# Patient Record
Sex: MALE | Race: BLACK OR AFRICAN AMERICAN | Employment: FULL TIME | URBAN - METROPOLITAN AREA
[De-identification: names, ages, dates, MRNs, and addresses within clinical notes are randomized per-mention and may not be internally consistent; named-entity substitution may affect disease eponyms.]

---

## 2017-01-24 ENCOUNTER — OFFICE VISIT (OUTPATIENT)
Dept: NEUROLOGY | Facility: CLINIC | Age: 32
End: 2017-01-24

## 2017-01-24 ENCOUNTER — TELEPHONE (OUTPATIENT)
Dept: NEUROLOGY | Facility: CLINIC | Age: 32
End: 2017-01-24

## 2017-01-24 VITALS
HEART RATE: 91 BPM | DIASTOLIC BLOOD PRESSURE: 87 MMHG | HEIGHT: 73 IN | BODY MASS INDEX: 37.14 KG/M2 | SYSTOLIC BLOOD PRESSURE: 136 MMHG | WEIGHT: 280.2 LBS

## 2017-01-24 DIAGNOSIS — G40.802 OTHER EPILEPSY WITHOUT STATUS EPILEPTICUS, NOT INTRACTABLE (H): Primary | ICD-10-CM

## 2017-01-24 LAB
BUN SERPL-MCNC: 11 MG/DL (ref 7–30)
CREAT SERPL-MCNC: 0.7 MG/DL (ref 0.66–1.25)
GFR SERPL CREATININE-BSD FRML MDRD: NORMAL ML/MIN/1.7M2

## 2017-01-24 RX ORDER — LEVETIRACETAM 500 MG/1
TABLET ORAL
Qty: 180 TABLET | Refills: 11 | Status: SHIPPED | OUTPATIENT
Start: 2017-01-24 | End: 2018-01-08

## 2017-01-24 NOTE — MR AVS SNAPSHOT
After Visit Summary   2017    Shabnam Maddox    MRN: [id number]           Patient Information     Date Of Birth          1985        Visit Information        Provider Department      2017 10:00 AM Sayra Hendricks PA [facility]        Today's Diagnoses     Other epilepsy without status epilepticus, not intractable (H)    -  1       Care Instructions    Continue levetiracetam as before  Call if seizures or concerns  Follow-up in 1 year         Follow-ups after your visit        Follow-up notes from your care team     Return in about 1 year (around 2018).      Who to contact     Please call your clinic at [phone] to:    Ask questions about your health    Make or cancel appointments    Discuss your medicines    Learn about your test results    Speak to your doctor   If you have compliments or concerns about an experience at your clinic, or if you wish to file a complaint, please contact [facility] Patient Relations at [phone] or email us at [email or web address]         Additional Information About Your Visit        MyChart Information     [employer] is an electronic gateway that provides easy, online access to your medical records. With [employer]mos, you can request a clinic appointment, read your test results, renew a prescription or communicate with your care team.     To sign up for [employer] visit the website at [email or web address]   You will be asked to enter the access code listed below, as well as some personal information. Please follow the directions to create your username and password.     Your access code is: [email or web address]  Expires: 2017 10:32 AM     Your access code will  in 90 days. If you need help or a new code, please contact your [facility] or call [phone] for assistance.        Care EveryWhere ID     This is your Care EveryWhere ID. This could be used by  "other organizations to access your [place] medical records  [id number]        Your Vitals Were     Pulse Height BMI (Body Mass Index)             91 6' 0.52\" (184.2 cm) 37.46 kg/m2          Blood Pressure from Last 3 Encounters:   01/24/17 136/87   01/19/16 133/84   01/19/15 129/79    Weight from Last 3 Encounters:   01/24/17 280 lb 3.2 oz (127.098 kg)   01/19/16 277 lb 9.6 oz (125.919 kg)   01/19/15 246 lb 12.8 oz (111.948 kg)              We Performed the Following     Creatinine     Levetiracetam level     Urea nitrogen (BUN)          Where to get your medicines      These medications were sent to [facility] [address]  [address] [place] [address] [address], [facility] [address]     Phone:  [phone]    - levETIRAcetam 500 MG tablet       Primary Care Provider Office Phone # Fax #    Steven Sousa [phone]       [facility] [id number] [facility] [address]        Thank you!     Thank you for choosing [facility]  for your care. Our goal is always to provide you with excellent care. Hearing back from our patients is one way we can continue to improve our services. Please take a few minutes to complete the written survey that you may receive in the mail after your visit with us. Thank you!             Your Updated Medication List - Protect others around you: Learn how to safely use, store and throw away your medicines at www.disposemymeds.org.          This list is accurate as of: 1/24/17 10:32 AM.  Always use your most recent med list.                   Brand Name Dispense Instructions for use    DAILY MULTIVITAMIN PO      Take by mouth daily       diazepam 5 MG/ML (HIGH CONC) solution    DIAZEPAM INTENSOL    30 mL    Sublingual prn for any seizure lasting 3 minutes or longer. May repeat x1 in 24 hours.       levETIRAcetam 500 MG tablet    KEPPRA    180 tablet    Take 3 tabs am and 3 tabs hs         "

## 2017-01-24 NOTE — TELEPHONE ENCOUNTER
Patient was seen today in clinic by Sayra Hendricks PA-C. Staff requested a new seizure protocol with todays date. Seizure protocol done and signed by Sayra and mailed to address listed in patient chart.

## 2017-01-24 NOTE — Clinical Note
Patient:  Shabnam Maddox  :   1985  MRN:     [id number]        Mr.Danieal SARAY Maddox  [address] [facility] [id number]        2017    Dear ,    We are writing to inform you of your test results.    Your test results fall within the expected range(s) or remain unchanged from previous results.  Please continue with current treatment plan.    Resulted Orders   Levetiracetam level   Result Value Ref Range    Levetiracetam Level 34.4       Comment:      Analysis performed by [employer], [employer] [place] [address]  Reference range: 10.0 to 40.0  Unit: ug/mL     Urea nitrogen (BUN)   Result Value Ref Range    Urea Nitrogen 11 7 - 30 mg/dL   Creatinine   Result Value Ref Range    Creatinine 0.70 0.66 - 1.25 mg/dL    GFR Estimate >90  Non  GFR Calc   >60 mL/min/1.7m2    GFR Estimate If Black >90   GFR Calc   >60 mL/min/1.7m2       Sayra Hendricks PA-C              [phone]  1985

## 2017-01-24 NOTE — Clinical Note
2017       RE: Shabnam Maddox  : 1985   MRN: [id number]      Dear Colleague,    Thank you for referring your patient, Shabnam Maddox, to the [facility] EPILEPSY [facility] at [facility]. Please see a copy of my visit note below.    [facility] Progress Note      Patient:  Shabnam Maddox  :  1985   Age:  31 year old   Today's Office Visit:  2017    Epilepsy Data:    Patient History: Gentleman with a history of developmental delay with static encephalopathy and autism who began having seizures at age 15. Initially seizures were infrequent. He went 7 years without a seizure on a very low dose of Tegretol. He had worsening of seizures , requiring escalating doses of Tegretol XR. Based on history available and the seizure semiology described by mother and group home staff, it is difficult to determine if he has a generalized seizure disorder or a partial onset seizure disorder. EEG at [facility] was fairly unremarkable, with no interictal discharges. We transitioned the Tegretol to levetiracetam and titrated up to 1500 mg BID and he has done well and has been seizure free. He takes generic levetiracetam.    Primary Epileptologist/Provider: Sayra MORIN  Epilepsy Syndrome:  (Undetermined)  Epilepsy Syndrome Status:  (undetermined)  Age of Onset: 15  Other Relevant Dx/ Issues: Mother is legal guardian     Tests/Surgery History  Last EE09  Last MRI: 09    Seizure Record  Current Visit Date: 17  Previous Visit Date: 16  Months since last visit: 12.19  Seizure Type 1: Unclassified Seizure  Description of Sz Type 1: whole jackie shaking and jerking, guttural sounds and deep breathing  # of Type 1 Seizure since last visit: 0  Freq. Type 1 / Month: 0    History of Present Illness:   No seizures this interval. Last seizure around 4485-9020. No problems tolerating levetiracetam. No concerns today.       Current Outpatient  "Prescriptions   Medication Sig Dispense Refill     diazepam (DIAZEPAM INTENSOL) 5 MG/ML solution Sublingual prn for any seizure lasting 3 minutes or longer. May repeat x1 in 24 hours. 30 mL 0     levETIRAcetam (KEPPRA) 500 MG tablet Take 3 tabs am and 3 tabs hs 180 tablet 11     Multiple Vitamin (DAILY MULTIVITAMIN PO) Take by mouth daily          Medication Notes:     Ref. Range 1/19/2015 12:04 1/19/2016 09:02   Levetiracetam Level Unknown 39.4 29.3     AED Side Effects Discussed: Yes   AED Medication Compliance:  compliant all of the time  Using a pill box:  Staff administered    Review of Systems:  Lethargy / Tiredness:  No  Nausea / Vomiting:  No  Double Vision:  No  Sleepiness:  No  Poor Balance:  No  Dizziness:  No  Appetite Changes:  No  Blurred Vision:  No  Sleep Changes:  No  Behavioral Changes:  No    Have you experienced a traumatic fall since your last visit: NO  Are these falls related to your seizures: Not Applicable    Other Issues:    Per staff exercising daily walking on treadmill and diet monitored to best of their ability  No changes in overall health  Mood and behavior seem stable.     Is patient safe to drive:  N/A    Exam:    /87 mmHg  Pulse 91  Ht 6' 0.52\" (184.2 cm)  Wt 280 lb 3.2 oz (127.098 kg)  BMI 37.46 kg/m2     Wt Readings from Last 5 Encounters:   01/24/17 280 lb 3.2 oz (127.098 kg)   01/19/16 277 lb 9.6 oz (125.919 kg)   01/19/15 246 lb 12.8 oz (111.948 kg)   12/31/13 261 lb 9.6 oz (118.661 kg)     General Appearance: Abnormal - alert, cognitive impairment  Gait: Normal, mildly wide-based  Attention Span: Abnormal - consistent with moderate static encephalopathy  Language: Abnormal - Follows most simple commands but not all, echolalia not noted today as in past  Extraocular Movements: Normal  Coordination: No tremor or dysmetria  Visual Fields: Not Examined  Facial Sensation: Not Examined  Facial Strength: Normal  Tongue Strength: Normal  Limb Strength: Able to stand from a " chair on his own, uncooperative with formal strength testing   Limb Tone: Not Examined  Limb Sensation: Not Examined    Assessment and Plan:   1. He is a 31 year old male with developmental delay, moderate static encephalopathy, autism and seizures. Since being transitioned to levetiracetam in 2009 he has remained seizure free.    Patient Instructions   Continue levetiracetam as before  Call if seizures or concerns  Follow-up in 1 year     As described above, I met with the patient for 15 minutes and during this time counseling was less than 50% of the visit time.    Again, thank you for allowing me to participate in the care of your patient.      Sincerely,    JEAN CARLOS Ovalles

## 2017-01-24 NOTE — Clinical Note
2017       RE: Shabnam Maddox  : 1985   MRN: [id number]      Name:  Shabnam Maddox   :  1985     Seizure Type Length Frequency Description    unclassified type  unknown Seizure  free x < 4 yrs whole body shaking,          guttural sound         deep breathing                  Treatment Protocol:  (including daily and emergency medications)    Emergency Med? Medication Dosage and Time Given Route of Admin Common Side Effects and Special Instructions    no  Levetiracetm  1500 mg Twice daily By Mouth  Irratability           Call if symptomatic                YES diazepam Intensol 5mg / ml  Give 1 ml ( 5mg ) for  Sublingual  lethargy       Seizure lasting  More than 3 minutes. May repeat once in 24 hours.     poor balance           Basic First Aid - Care and Comfort:   Basic Seizure First Aid:  - Stay calm and track time  - Keep person safe  - Do not restrain  - Do not put anything in mouth  - Stay with person until fully conscious  - Record seizure in log    For tonic-clonic (grand mal) seizure:  - Protect head  - Keep airway open and watch breathing and color  - After the seizure, turn person on side        Does person need to leave the room/area after a seizure:  NO    Emergency Response:  A SEIZURE IS CONSIDERED AN EMERGENY WHEN:   - A convulsion (tonic-clonic) seizure lasts longer than 5 minutes  - There are repeated seizures without regaining consciousness  - It is a first-time seizure  - The person is injured or has diabetes  - The person has breathing difficulties  - The seizure is in water        Seizure Emergency Protocol:    Administer emergency medications as listed above.  Call 911 for seizure that does not end after emergency med ( Diazepam ) given.         Physician Signature:  _____________________________________________

## 2017-01-24 NOTE — PROGRESS NOTES
[facility] Epilepsy Care Progress Note      Patient:  Shabnam Maddox  :  1985   Age:  31 year old   Today's Office Visit:  2017    Epilepsy Data:    Patient History: Gentleman with a history of developmental delay with static encephalopathy and autism who began having seizures at age 15. Initially seizures were infrequent. He went 7 years without a seizure on a very low dose of Tegretol. He had worsening of seizures , requiring escalating doses of Tegretol XR. Based on history available and the seizure semiology described by mother and group home staff, it is difficult to determine if he has a generalized seizure disorder or a partial onset seizure disorder. EEG at [facility] was fairly unremarkable, with no interictal discharges. We transitioned the Tegretol to levetiracetam and titrated up to 1500 mg BID and he has done well and has been seizure free. He takes generic levetiracetam.    Primary Epileptologist/Provider: Sayra MORIN  Epilepsy Syndrome:  (Undetermined)  Epilepsy Syndrome Status:  (undetermined)  Age of Onset: 15  Other Relevant Dx/ Issues: Mother is legal guardian     Tests/Surgery History  Last EE09  Last MRI: 09    Seizure Record  Current Visit Date: 17  Previous Visit Date: 16  Months since last visit:   Seizure Type 1: Unclassified Seizure  Description of Sz Type 1: whole jackie shaking and jerking, guttural sounds and deep breathing  # of Type 1 Seizure since last visit: 0  Freq. Type 1 / Month: 0    History of Present Illness:   No seizures this interval. Last seizure around 4158-5846. No problems tolerating levetiracetam. No concerns today.       Current Outpatient Prescriptions   Medication Sig Dispense Refill     diazepam (DIAZEPAM INTENSOL) 5 MG/ML solution Sublingual prn for any seizure lasting 3 minutes or longer. May repeat x1 in 24 hours. 30 mL 0     levETIRAcetam (KEPPRA) 500 MG tablet Take 3 tabs am and 3 tabs hs 180 tablet 11      "Multiple Vitamin (DAILY MULTIVITAMIN PO) Take by mouth daily          Medication Notes:     Ref. Range 1/19/2015 12:04 1/19/2016 09:02   Levetiracetam Level Unknown 39.4 29.3     AED Side Effects Discussed: Yes   AED Medication Compliance:  compliant all of the time  Using a pill box:  Staff administered    Review of Systems:  Lethargy / Tiredness:  No  Nausea / Vomiting:  No  Double Vision:  No  Sleepiness:  No  Poor Balance:  No  Dizziness:  No  Appetite Changes:  No  Blurred Vision:  No  Sleep Changes:  No  Behavioral Changes:  No    Have you experienced a traumatic fall since your last visit: NO  Are these falls related to your seizures: Not Applicable    Other Issues:    Per staff exercising daily walking on treadmill and diet monitored to best of their ability  No changes in overall health  Mood and behavior seem stable.     Is patient safe to drive:  N/A    Exam:    /87 mmHg  Pulse 91  Ht 6' 0.52\" (184.2 cm)  Wt 280 lb 3.2 oz (127.098 kg)  BMI 37.46 kg/m2     Wt Readings from Last 5 Encounters:   01/24/17 280 lb 3.2 oz (127.098 kg)   01/19/16 277 lb 9.6 oz (125.919 kg)   01/19/15 246 lb 12.8 oz (111.948 kg)   12/31/13 261 lb 9.6 oz (118.661 kg)     General Appearance: Abnormal - alert, cognitive impairment  Gait: Normal, mildly wide-based  Attention Span: Abnormal - consistent with moderate static encephalopathy  Language: Abnormal - Follows most simple commands but not all, echolalia not noted today as in past  Extraocular Movements: Normal  Coordination: No tremor or dysmetria  Visual Fields: Not Examined  Facial Sensation: Not Examined  Facial Strength: Normal  Tongue Strength: Normal  Limb Strength: Able to stand from a chair on his own, uncooperative with formal strength testing   Limb Tone: Not Examined  Limb Sensation: Not Examined    Assessment and Plan:   1. He is a 31 year old male with developmental delay, moderate static encephalopathy, autism and seizures. Since being transitioned to " levetiracetam in 2009 he has remained seizure free.    Patient Instructions   Continue levetiracetam as before  Call if seizures or concerns  Follow-up in 1 year     As described above, I met with the patient for 15 minutes and during this time counseling was less than 50% of the visit time.

## 2017-01-26 LAB — LEVETIRACETAM SERPL-MCNC: 34.4 UG/ML

## 2017-03-27 DIAGNOSIS — G40.802 OTHER EPILEPSY, NOT INTRACTABLE, WITHOUT STATUS EPILEPTICUS (H): ICD-10-CM

## 2017-03-27 RX ORDER — DIAZEPAM INTENSOL 5 MG/ML
SOLUTION, CONCENTRATE ORAL
Qty: 30 ML | Refills: 0 | Status: SHIPPED | OUTPATIENT
Start: 2017-03-27 | End: 2017-03-28

## 2017-03-28 ENCOUNTER — TELEPHONE (OUTPATIENT)
Dept: NEUROLOGY | Facility: CLINIC | Age: 32
End: 2017-03-28

## 2017-03-28 DIAGNOSIS — G40.802 OTHER EPILEPSY, NOT INTRACTABLE, WITHOUT STATUS EPILEPTICUS (H): ICD-10-CM

## 2017-03-28 RX ORDER — LORAZEPAM 2 MG/ML
CONCENTRATE ORAL
Qty: 30 ML | Refills: 0 | Status: SHIPPED | OUTPATIENT
Start: 2017-03-28 | End: 2017-04-03

## 2017-03-31 DIAGNOSIS — G40.802 OTHER EPILEPSY, NOT INTRACTABLE, WITHOUT STATUS EPILEPTICUS (H): ICD-10-CM

## 2017-03-31 RX ORDER — LORAZEPAM 2 MG/ML
SOLUTION, CONCENTRATE ORAL
Status: CANCELLED | OUTPATIENT
Start: 2017-03-31

## 2017-04-03 ENCOUNTER — TELEPHONE (OUTPATIENT)
Dept: NEUROLOGY | Facility: CLINIC | Age: 32
End: 2017-04-03

## 2017-04-03 DIAGNOSIS — G40.802 OTHER EPILEPSY, NOT INTRACTABLE, WITHOUT STATUS EPILEPTICUS (H): ICD-10-CM

## 2017-04-03 RX ORDER — LORAZEPAM 2 MG/ML
CONCENTRATE ORAL
Qty: 30 ML | Refills: 0 | Status: SHIPPED | OUTPATIENT
Start: 2017-04-03 | End: 2018-01-08 | Stop reason: ALTCHOICE

## 2018-01-08 ENCOUNTER — OFFICE VISIT (OUTPATIENT)
Dept: NEUROLOGY | Facility: CLINIC | Age: 33
End: 2018-01-08
Payer: MEDICAID

## 2018-01-08 ENCOUNTER — TELEPHONE (OUTPATIENT)
Dept: NEUROLOGY | Facility: CLINIC | Age: 33
End: 2018-01-08

## 2018-01-08 VITALS
SYSTOLIC BLOOD PRESSURE: 137 MMHG | HEART RATE: 73 BPM | WEIGHT: 284.4 LBS | HEIGHT: 73 IN | BODY MASS INDEX: 37.69 KG/M2 | DIASTOLIC BLOOD PRESSURE: 91 MMHG

## 2018-01-08 DIAGNOSIS — G40.802 OTHER EPILEPSY WITHOUT STATUS EPILEPTICUS, NOT INTRACTABLE (H): ICD-10-CM

## 2018-01-08 RX ORDER — LEVETIRACETAM 500 MG/1
TABLET ORAL
Qty: 180 TABLET | Refills: 11 | Status: SHIPPED | OUTPATIENT
Start: 2018-01-08 | End: 2019-01-03

## 2018-01-08 NOTE — PROGRESS NOTES
[facility] Epilepsy Care Progress Note    Patient:  Shabnam Maddox  :  1985   Age:  32 year old   Today's Office Visit:  2018    Epilepsy Data  Patient History: Shabnam Maddox is a 32 year old gentleman with history of developmental delay with static encephalopathy and autism.   Patient is unable to provide meaningful history of seizures, nor are his group home staff. According to prior notes from Sayra Hendricks, as well as from Dr. Soria: Seizures began at age 15, and reportedly consisted of staring spells, with limb shaking/jerking, guttural sounds and deep breathing. He was well controlled on tegretol for many years, then in  (at age 22), he developed multiple breakthrough seizures. He transitioned from tegretol to keppra, and titrated up to 1500mg BID. He has been seizure free on this regimen since approximately 2008.    Primary Epileptologist/Provider: Erika Morales M.D.  Epilepsy Syndrome:  (Undetermined)  Epilepsy Syndrome Status:  (undetermined)  Age of Onset: 15  Other Relevant Dx/ Issues: Mother is legal guardian     Tests/Surgery History  Last EE09 - reportedly normal, per Dr. Soria's notes, though report is not available.  Last MRI brain: 09 - non-contrast MRI done at [facility] according to [facility] seizure protocol was read as normal.    Seizure Record  Current Visit Date: 18  Previous Visit Date: 17  Months since last visit: 11.47  Seizure Type 1: Unclassified Seizure  Description of Sz Type 1: whole jackie shaking and jerking, guttural sounds and deep breathing  # of Type 1 Seizure since last visit: 0  Freq. Type 1 / Month: 0    Interval History  Patient returns for annual follow-up, accompanied by his group home staff - RN and PCA.    Shabnam has had no seizures since 6672-8220. No hospital admissions in the last year. No falls.  He has obesity, and had some weight gain up to 300 pounds earlier this year, but they have gotten his weight back down to around  "280 recently. Goal is <260.    Patient is unable to complete a 10-point review of systems, due to mentation.    Past Medical/Surgical History  Past Medical History:   Diagnosis Date     Autism      Developmental delay      Medications  Current AEDs:  Keppra 1500mg BID - no anger/irritability issues  Lorazepam intensol 2mg/ml - hold 1ml under tongue for 2 sz/24h or any sz>3 min. - Patient has never used this.    Patient is given his medications twice daily at his group home.    Prior AEDs:  Tegretol - transitioned off this in 2008 due to inefficacy.    No Known Allergies    Family History  Very little is known about FH. Mother did not have seizures. Mother does have obesity and diabetes. Other aspects of family hsitory are unknown.    Social History  Patient resides at a group home. He is well behaved and group home staff have no complaints about behavioral outbursts.      OBJECTIVE  BP (!) 137/91 (BP Location: Right arm, Patient Position: Chair, Cuff Size: Adult Large)  Pulse 73  Ht 6' 0.52\" (184.2 cm)  Wt 284 lb 6.4 oz (129 kg)  BMI 38.02 kg/m2  GEN Cooperative. NAD.  HEENT Sclerae anicteric, MMM  [facility]S Peripheral Pulse palpable. Regular rate and rhythm. No murmurs.  PULM No respiratory distress. CTAB.  ABD NT. No masses. Bowel sounds present.  MSK ROM intact. No joint swelling.  PSYC Affect is pleasant.  EYE Limited view due to patient cooperation, but disc margins appear crisp, with normal, uncrowded vessels.  NEURO   MS Patient is alert, and pleasantly interactive. Exhibits echolalia - generates very little original speech. Is able to say the names of his group home staff members. Not able to answer questions of orientation, nor complete calculations.   CN Visual fields grossly intact - follows movement in all outer quadrants of both eyes. No obvious limitation in range of extraocular movements. Facial movements symmetric. Hearing intact to conversation. Uvula midline. Tongue protrusion midline.  No overt " dysarthria   MTR No involuntary movements observed. Mild paratonias of the arms and legs - no spasticity detected. No drift.   Patient unable to participate in formal strength testing due to mentation - does move all limbs antigravity without problem.   SNS Answers in the affirmative to sensation of light touch in the face, arm, and leg.    RFLX 2+ and symmetric in biceps, brach. 0 at the knees, ankles. Plantars mute.   CRD Finger nose is slow but intact. Heel meyer largely intact, though limited by patient understanding of task.   GAIT Mild variability to foot placement/gait width. Turns without difficulty. Normal station.      IMPRESSION  # Epilepsy, NOS  Patient with unspecified seizure disorder - no seizures in nearly 10 years, and no witnesses present to report on prior seizure semiology. Prior workup for seizures was unrevealing (normal MR brain and EEG in 2009). Nevertheless, as he did have multiple breakthrough seizures in 2008, and as he appears to be tolerating keppra very well, it would be prudent to continue anticonvulsant treatment for the forseeable future.    Last keppra level 1/24/17 was 34.4. No new meds since that visit - no need to recheck level today.    Maintaining an available supply of lorazepam has become burdensome to the patient's facility, and they asked today whether it was necessary. As he has not had a seizure in nearly 10 years, it would be reasonable to discontinue the current standing orders for PRN lorazepam. Seizure plan will need to be in place, with instructions to call 911 for convulsive seizures, and to call [facility] clinic ASAP for smaller seizures. Should any convulsive seizures recur in the future, resumption of standing rescue med would be in order.    PLAN  1. Continue keppra 1500mg BID  2. Discontinue lorazepam intensol standing order  3. Return to clinic in 1 year    Patient seen and discussed with staff neurologist, Dr. Morales, who agrees with the plan.  Steven Traore MD.  Epilepsy Fellow.          Neurology Attending Note:    I have seen and examined the patient with the resident.  I have reviewed the labs and imaging results available.  I agree with the assessment and plan.    Erika Morales MD

## 2018-01-08 NOTE — LETTER
2018       RE: Shabnam Maddox  : 1985   MRN: [id number]      Dear Colleague,    Thank you for referring your patient, Shabnam Maddox, to the [facility] at [facility]. Please see a copy of my visit note below.    [facility] Ep[facility]psy [facility] Progress Note    Patient:  Shabnam Maddox  :  1985   Age:  32 year old   Today's Office Visit:  2018    Epilepsy Data  Patient History: Shabnam Maddox is a 32 year old gentleman with history of developmental delay with static encephalopathy and autism.   Patient is unable to provide meaningful history of seizures, nor are his group home staff. According to prior notes from Sayra Hendricks, as well as from Dr. Soria: Seizures began at age 15, and reportedly consisted of staring spells, with limb shaking/jerking, guttural sounds and deep breathing. He was well controlled on tegretol for many years, then in  (at age 22), he developed multiple breakthrough seizures. He transitioned from tegretol to keppra, and titrated up to 1500mg BID. He has been seizure free on this regimen since approximately 2008.    Primary Epileptologist/Provider: Erika Morales M.D.  Epilepsy Syndrome:  (Undetermined)  Epilepsy Syndrome Status:  (undetermined)  Age of Onset: 15  Other Relevant Dx/ Issues: Mother is legal guardian     Tests/Surgery History  Last EE09 - reportedly normal, per Dr. Soria's notes, though report is not available.  Last MRI brain: 09 - non-contrast MRI done at [facility] according to [facility] seizure protocol was read as normal.    Seizure Record  Current Visit Date: 18  Previous Visit Date: 17  Months since last visit: 11.47  Seizure Type 1: Unclassified Seizure  Description of Sz Type 1: whole jackie shaking and jerking, guttural sounds and deep breathing  # of Type 1 Seizure since last visit: 0  Freq. Type 1 / Month: 0    Interval History  Patient returns for annual  "follow-up, accompanied by his group home staff - RN and PCA.    Shabnam has had no seizures since 5276-7641. No hospital admissions in the last year. No falls.  He has obesity, and had some weight gain up to 300 pounds earlier this year, but they have gotten his weight back down to around 280 recently. Goal is <260.    Patient is unable to complete a 10-point review of systems, due to mentation.    Past Medical/Surgical History  Past Medical History:   Diagnosis Date     Autism      Developmental delay      Medications  Current AEDs:  Keppra 1500mg BID - no anger/irritability issues  Lorazepam intensol 2mg/ml - hold 1ml under tongue for 2 sz/24h or any sz>3 min. - Patient has never used this.    Patient is given his medications twice daily at his group home.    Prior AEDs:  Tegretol - transitioned off this in 2008 due to inefficacy.    No Known Allergies    Family History  Very little is known about FH. Mother did not have seizures. Mother does have obesity and diabetes. Other aspects of family hsitory are unknown.    Social History  Patient resides at a group home. He is well behaved and group home staff have no complaints about behavioral outbursts.      OBJECTIVE  BP (!) 137/91 (BP Location: Right arm, Patient Position: Chair, Cuff Size: Adult Large)  Pulse 73  Ht 6' 0.52\" (184.2 cm)  Wt 284 lb 6.4 oz (129 kg)  BMI 38.02 kg/m2  GEN Cooperative. NAD.  HEENT Sclerae anicteric, MMM  CVS Peripheral Pulse palpable. Regular rate and rhythm. No murmurs.  PULM No respiratory distress. CTAB.  ABD NT. No masses. Bowel sounds present.  MSK ROM intact. No joint swelling.  PSYC Affect is pleasant.  EYE Limited view due to patient cooperation, but disc margins appear crisp, with normal, uncrowded vessels.  NEURO   MS Patient is alert, and pleasantly interactive. Exhibits echolalia - generates very little original speech. Is able to say the names of his group home staff members. Not able to answer questions of orientation, " nor complete calculations.   CN Visual fields grossly intact - follows movement in all outer quadrants of both eyes. No obvious limitation in range of extraocular movements. Facial movements symmetric. Hearing intact to conversation. Uvula midline. Tongue protrusion midline.  No overt dysarthria   MTR No involuntary movements observed. Mild paratonias of the arms and legs - no spasticity detected. No drift.   Patient unable to participate in formal strength testing due to mentation - does move all limbs antigravity without problem.   SNS Answers in the affirmative to sensation of light touch in the face, arm, and leg.    RFLX 2+ and symmetric in biceps, brach. 0 at the knees, ankles. Plantars mute.   CRD Finger nose is slow but intact. Heel meyer largely intact, though limited by patient understanding of task.   GAIT Mild variability to foot placement/gait width. Turns without difficulty. Normal station.      IMPRESSION  # Epilepsy, NOS  Patient with unspecified seizure disorder - no seizures in nearly 10 years, and no witnesses present to report on prior seizure semiology. Prior workup for seizures was unrevealing (normal MR brain and EEG in 2009). Nevertheless, as he did have multiple breakthrough seizures in 2008, and as he appears to be tolerating keppra very well, it would be prudent to continue anticonvulsant treatment for the forseeable future.    Last keppra level 1/24/17 was 34.4. No new meds since that visit - no need to recheck level today.    Maintaining an available supply of lorazepam has become burdensome to the patient's facility, and they asked today whether it was necessary. As he has not had a seizure in nearly 10 years, it would be reasonable to discontinue the current standing orders for PRN lorazepam. Seizure plan will need to be in place, with instructions to call 911 for convulsive seizures, and to call [facility] ASAP for smaller seizures. Should any convulsive seizures recur in the  future, resumption of standing rescue med would be in order.    PLAN  1. Continue keppra 1500mg BID  2. Discontinue lorazepam intensol standing order  3. Return to clinic in 1 year    Patient seen and discussed with staff neurologist, Dr. Morales, who agrees with the plan.  Steven Traore MD. Epilepsy Fellow.          Neurology Attending Note:    I have seen and examined the patient with the resident.  I have reviewed the labs and imaging results available.  I agree with the assessment and plan.    Erika Morales MD

## 2018-01-08 NOTE — PATIENT INSTRUCTIONS
1. Continue keppra 1500mg Twice daily  2. Stop lorazepam PRN  3. Return to clinic in 1 year    SEIZURE MANAGEMENT PLAN:  For convulsive seizures (Generalized Tonic-Clonic / GTC), or for very prolonged (>5 minutes) smaller seizures, call 911 to be evaluated emergently.  For other seizures, please call the [facility] at [phone].

## 2018-01-08 NOTE — MR AVS SNAPSHOT
After Visit Summary   1/8/2018    Shabnam Maddox    MRN: [id number]           Patient Information     Date Of Birth          1985        Visit Information        Provider Department      1/8/2018 9:30 AM Erika Morales MD [facility] Epilepsy Care        Today's Diagnoses     Other epilepsy without status epilepticus, not intractable (H)          Care Instructions    1. Continue keppra 1500mg Twice daily  2. Stop lorazepam PRN  3. Return to clinic in 1 year    SEIZURE MANAGEMENT PLAN:  For convulsive seizures (Generalized Tonic-Clonic / GTC), or for very prolonged (>5 minutes) smaller seizures, call 911 to be evaluated emergently.  For other seizures, please call the [facility] at [phone].          Follow-ups after your visit        Follow-up notes from your care team     Return in about 1 year (around 1/8/2019).      Who to contact     Please call your clinic at [phone] to:    Ask questions about your health    Make or cancel appointments    Discuss your medicines    Learn about your test results    Speak to your doctor   If you have compliments or concerns about an experience at your clinic, or if you wish to file a complaint, please contact [facility] Patient Relations at [phone] or email us at [email or web address]         Additional Information About Your Visit        MyChart Information     [employer] is an electronic gateway that provides easy, online access to your medical records. With [employer], you can request a clinic appointment, read your test results, renew a prescription or communicate with your care team.     To sign up for [employer] visit the website at [email or web address]   You will be asked to enter the access code listed below, as well as some personal information. Please follow the directions to create your username and password.     Your access code is: [email or web address]  Expires: 4/8/2018 10:36 AM     Your  "access code will  in 90 days. If you need help or a new code, please contact your [facility] or call [phone] for assistance.        Care EveryWhere ID     This is your Care EveryWhere ID. This could be used by other organizations to access your [facility] medical records  [id number]        Your Vitals Were     Pulse Height BMI (Body Mass Index)             73 6' 0.52\" (184.2 cm) 38.02 kg/m2          Blood Pressure from Last 3 Encounters:   18 (!) 137/91   17 136/87   16 133/84    Weight from Last 3 Encounters:   18 284 lb 6.4 oz (129 kg)   17 280 lb 3.2 oz (127.1 kg)   16 277 lb 9.6 oz (125.9 kg)              Today, you had the following     No orders found for display         Today's Medication Changes          These changes are accurate as of: 18 10:36 AM.  If you have any questions, ask your nurse or doctor.               Stop taking these medicines if you haven't already. Please contact your care team if you have questions.     LORazepam 2 MG/ML (HIGH CONC) solution   Commonly known as:  LORazepam INTENSOL   Stopped by:  Erika Morales MD                Where to get your medicines      These medications were sent to [facility] - [address]  [address], [facility] [address]     Phone:  [phone]     levETIRAcetam 500 MG tablet                Primary Care Provider Office Phone # Fax #    Steven Sousa [phone]       [facility] [address]  [facility] [address]        Equal Access to Services     ADRIAN FUENTES AH: Hadii moiz Emmanuel, waandreyda luqadaha, qaybta kaalmada keniada, jarred singh. So [facility] [phone].    ATENCIÓN: Si habla español, tiene a de santiago disposición servicios gratuitos de asistencia lingüística. Llame al [phone].    We comply with applicable federal civil rights laws and [place] laws. We do not " discriminate on the basis of race, color, national origin, age, disability, sex, sexual orientation, or gender identity.            Thank you!     Thank you for choosing [facility]  for your care. Our goal is always to provide you with excellent care. Hearing back from our patients is one way we can continue to improve our services. Please take a few minutes to complete the written survey that you may receive in the mail after your visit with us. Thank you!             Your Updated Medication List - Protect others around you: Learn how to safely use, store and throw away your medicines at www.disposemymeds.org.          This list is accurate as of: 1/8/18 10:36 AM.  Always use your most recent med list.                   Brand Name Dispense Instructions for use Diagnosis    DAILY MULTIVITAMIN PO      Take by mouth daily        levETIRAcetam 500 MG tablet    KEPPRA    180 tablet    Take 3 tabs am and 3 tabs hs    Other epilepsy without status epilepticus, not intractable (H)

## 2019-01-03 ENCOUNTER — OFFICE VISIT (OUTPATIENT)
Dept: NEUROLOGY | Facility: CLINIC | Age: 34
End: 2019-01-03
Payer: MEDICAID

## 2019-01-03 VITALS
HEART RATE: 81 BPM | WEIGHT: 268 LBS | BODY MASS INDEX: 35.83 KG/M2 | DIASTOLIC BLOOD PRESSURE: 86 MMHG | SYSTOLIC BLOOD PRESSURE: 126 MMHG | TEMPERATURE: 97.2 F

## 2019-01-03 DIAGNOSIS — G40.802 OTHER EPILEPSY WITHOUT STATUS EPILEPTICUS, NOT INTRACTABLE (H): ICD-10-CM

## 2019-01-03 RX ORDER — LEVETIRACETAM 500 MG/1
1500 TABLET ORAL 2 TIMES DAILY
Qty: 180 TABLET | Refills: 11 | Status: SHIPPED | OUTPATIENT
Start: 2019-01-03 | End: 2019-12-06

## 2019-01-03 NOTE — PROGRESS NOTES
[facility] Epilepsy Care Progress Note      Patient:  Shabnam Maddox  :  1985   Age:  33 year old   Today's Office Visit:  1/3/2019    Epilepsy Data:    Patient History  Primary Epileptologist/Provider: Erika Morales M.D.  Epilepsy Syndrome: (Undetermined)  Epilepsy Syndrome Status: (undetermined)  Age of Onset: 15  Other Relevant Dx/ Issues: Mother is legal guardian     Tests/Surgery History  Last EE09  Last MRI: 09    Seizure Record  Current Visit Date: 19  Previous Visit Date: 18  Months since last visit: 11.83  Seizure Type 1: Unclassified Seizure  Description of Sz Type 1: whole jackie shaking and jerking, guttural sounds and deep breathing  # of Type 1 Seizure since last visit: 0  Freq. Type 1 / Month: 0        Current Outpatient Medications   Medication Sig Dispense Refill     levETIRAcetam (KEPPRA) 500 MG tablet Take 3 tabs am and 3 tabs hs 180 tablet 11     Multiple Vitamin (DAILY MULTIVITAMIN PO) Take by mouth daily             Interval History  33 years old with history of seizures in . Patient returns for annual follow-up, accompanied by his group home staff.    Shabnam has had no seizures since . No hospital admissions in the last year. No falls.    He has obesity, and had some weight gain up to 300 pounds earlier this year, but they have gotten his weight back down to around 280 recently. Goal is <260.    Patient is unable to complete a 10-point review of systems, due to cognitive problmes.    Past Medical/Surgical History  Past Medical History:   Diagnosis Date     Autism      Developmental delay      Medications  Current AEDs:  Keppra 1500mg BID - no anger/irritability issues  Lorazepam intensol 2mg/ml - hold 1ml under tongue for 2 sz/24h or any sz>3 min. - Patient has never used this.    Patient is given his medications twice daily at his group home.    Prior AEDs:  Tegretol - transitioned off this in  due to inefficacy.    No Known Allergies    Family  History  Very little is known about FH. Mother did not have seizures. Mother does have obesity and diabetes. Other aspects of family hsitory are unknown.    Social History  Patient resides at a group home. He is well behaved and group home staff have no complaints about behavioral outbursts.      OBJECTIVE  /86 (BP Location: Left arm, Patient Position: Chair, Cuff Size: Adult Large)   Pulse 81   Temp 97.2  F (36.2  C)   Wt 268 lb (121.6 kg)   BMI 35.83 kg/m      General exam: General Appearance: No acute distress. HEENT: Normocephalic, atraumatic.  Extremities: No edema, no clubbing, no cyanosis.     Neurologic Exam: Alert and oriented x1. Speech fluent, appropriate. Normal attention. Cranial Nerves: Pupils are equal, round, reactive to light and accomodation. Extraocular movement intact. No facial weakness or asymmetry. Hearing normal. Motor Exam: Normal. Coordination:no ataxia.  Gait and Station: normal.      IMPRESSION  # Epilepsy, NOS  Patient with unspecified seizure disorder - no seizures in nearly 10 years, and no witnesses present to report on prior seizure semiology. Prior workup for seizures was unrevealing (normal MR brain and EEG in 2009). Nevertheless, as he did have multiple breakthrough seizures in 2008, and as he appears to be tolerating keppra very well, it would be prudent to continue anticonvulsant treatment for the forseeable future.    Last keppra level 1/24/17 was 34.4. No new meds since that visit - no need to recheck level today.    PLAN  1. Continue keppra 1500mg BID  2. Return to clinic in 1 year      15 min was spent on the visit.  Over 50% of the time was spent on education, counseling about optimal seizure control and coordination of care.

## 2019-01-03 NOTE — PATIENT INSTRUCTIONS
Times of Days am  pm        Medication Tablet Size Number of Tablets/Capsules Total Daily Dosage    Keppra  500 3 3        3000 mg                                                                                                                                   Carry this with you at all times.  CONTINUE TAKING YOUR OTHER MEDICATIONS AS PREVIOUSLY DIRECTED.      * * *Do not store medications in the bathroom.  Keep medications away from children!* * *

## 2019-01-31 ENCOUNTER — TELEPHONE (OUTPATIENT)
Dept: NEUROLOGY | Facility: CLINIC | Age: 34
End: 2019-01-31

## 2019-01-31 NOTE — TELEPHONE ENCOUNTER
----- Message from [employer] CMA sent at 1/31/2019 12:43 PM CST -----  Regarding: chandanaul  Caller: lucero    Relationship to Patient: staff at     Call Back Number: [phone]    Reason for Call: needs seizure protocol.     Fax: [phone]

## 2019-01-31 NOTE — TELEPHONE ENCOUNTER
Chart reviewed. Seizure protocol was updated with current documentation.   Sent to MD for review and signature.

## 2019-01-31 NOTE — LETTER
2019       RE: Shabnam Maddox  [address] [facility] [address]        Name:  Shabnam Maddox   :  1985      Seizure Type Length Frequency Description   Unclassified type unknown Seizure free x >10 years Whole body shaking, gutteral sound, deep breathing       Treatment Protocol:  (including daily and emergency medications)  Emergency medication Medication Name Dosage and times Route of administration Common Side effects and special instructions   no levetiracetam (Keppra) Twice a day (AM/PM) By mouth Irritability       Call if symptomatic           Basic First Aid - Care and Comfort:    Basic Seizure First Aid:  - Stay calm and track time  - Keep person safe  - Do not restrain  - Do not put anything in mouth  - Stay with person until fully conscious  - Record seizure in log    For tonic-clonic (grand mal) seizure:  - Protect head  - Keep airway open and watch breathing and color  - After the seizure, turn person on side           Does person need to leave the room/area after a seizure:  NO     Emergency Response:  A SEIZURE IS CONSIDERED AN EMERGENY WHEN:   - A convulsion (tonic-clonic) seizure lasts longer than 5 minutes  - There are repeated seizures without regaining consciousness  - It is a first-time seizure  - The person is injured or has diabetes  - The person has breathing difficulties  - The seizure is in water           Seizure Emergency Protocol:    SEIZURE MANAGEMENT PLAN:  For convulsive seizures (Generalized Tonic-Clonic / GTC), or for very prolonged (>5 minutes) smaller seizures, call 911 to be evaluated emergently.  For other seizures, please call the [facility]psy [facility] at [phone].              Physician Signature:  _____________________________________________

## 2019-12-06 DIAGNOSIS — G40.802 OTHER EPILEPSY WITHOUT STATUS EPILEPTICUS, NOT INTRACTABLE (H): ICD-10-CM

## 2019-12-06 RX ORDER — LEVETIRACETAM 500 MG/1
TABLET ORAL
Qty: 180 TABLET | Refills: 1 | Status: SHIPPED | OUTPATIENT
Start: 2019-12-06 | End: 2020-01-02

## 2019-12-06 NOTE — TELEPHONE ENCOUNTER
Medication request meets [place] Medication Refill Protocol - Seizure Medications (non-controlled) requirements. Refill meets protocol.

## 2020-01-02 ENCOUNTER — OFFICE VISIT (OUTPATIENT)
Dept: NEUROLOGY | Facility: CLINIC | Age: 35
End: 2020-01-02
Payer: MEDICAID

## 2020-01-02 VITALS
WEIGHT: 276.2 LBS | SYSTOLIC BLOOD PRESSURE: 148 MMHG | BODY MASS INDEX: 36.92 KG/M2 | DIASTOLIC BLOOD PRESSURE: 90 MMHG | HEART RATE: 77 BPM

## 2020-01-02 DIAGNOSIS — G40.802 OTHER EPILEPSY WITHOUT STATUS EPILEPTICUS, NOT INTRACTABLE (H): ICD-10-CM

## 2020-01-02 RX ORDER — LEVETIRACETAM 500 MG/1
TABLET ORAL
Qty: 180 TABLET | Refills: 11 | Status: SHIPPED | OUTPATIENT
Start: 2020-01-02 | End: 2021-01-10

## 2020-01-02 NOTE — LETTER
2020     RE: Shabnam Maddox  : 1985   MRN: [id number]      Dear Colleague,    Thank you for referring your patient, Shabnam Maddox, to the [facility] EPILEPSY CARE at [facility]. Please see a copy of my visit note below.    [facility]      Patient:  Shabnam Maddox  :  1985   Age:  33 year old   Today's Office Visit:  2020    Epilepsy Data:    Patient History  Primary Epileptologist/Provider: Erika Morales M.D.  Epilepsy Syndrome: (Undetermined)  Epilepsy Syndrome Status: (undetermined)  Age of Onset: 15  Other Relevant Dx/ Issues: Mother is legal guardian     Tests/Surgery History  Last EE09  Last MRI: 09    Seizure Record  Current Visit Date: 20  Previous Visit Date: 19  Months since last visit: 12  Seizure Type 1: Unclassified Seizure  Description of Sz Type 1: whole jackie shaking and jerking, guttural sounds and deep breathing  # of Type 1 Seizure since last visit: 0  Freq. Type 1 / Month: 0        Current Outpatient Medications   Medication Sig Dispense Refill     levETIRAcetam (KEPPRA) 500 MG tablet TAKE THREE TABLETS (1500MG) BY MOUTH TWICE A  tablet 1     Multiple Vitamin (DAILY MULTIVITAMIN PO) Take by mouth daily             Interval History  33 years old with history of seizures in . Patient returns for annual follow-up, accompanied by his group home staff, Bouchra, who had worked with the patient for 3 years.    Shabnam has had no seizures since . No hospital admissions in the last year. No falls.    He has obesity, weight today is 276. Goal is <260.    Patient is unable to complete review of systems, due to cognitive problmes.    Past Medical/Surgical History  Past Medical History:   Diagnosis Date     Autism      Developmental delay      Medications  Current AEDs:  Keppra 1500mg BID - no anger/irritability issues  Lorazepam intensol 2mg/ml - hold 1ml under tongue for 2  sz/24h or any sz>3 min. - Patient has never used this.    Patient is given his medications twice daily at his group home.    Prior AEDs:  Tegretol - transitioned off this in 2008 due to inefficacy.    No Known Allergies    Family History  Very little is known about FH. Mother did not have seizures. Mother does have obesity and diabetes. Other aspects of family hsitory are unknown.    Social History  Patient resides at a group home. He is well behaved and group home staff have no complaints about behavioral outbursts.      OBJECTIVE  Wt 125.3 kg (276 lb 3.2 oz)   BMI 36.92 kg/m       General exam: General Appearance: No acute distress. HEENT: Normocephalic, atraumatic.  Extremities: No edema, no clubbing, no cyanosis.     Neurologic Exam: Alert and oriented x2. Speech fluent, appropriate. Normal attention. Cranial Nerves: Pupils are equal, round, reactive to light and accomodation. Extraocular movement intact. No facial weakness or asymmetry. Hearing normal. Motor Exam: Normal. Coordination:no ataxia.  Gait and Station: normal.      IMPRESSION  # Epilepsy, NOS  Patient with unspecified seizure disorder - no seizures in nearly 10 years, and no witnesses present to report on prior seizure semiology. Prior workup for seizures was unrevealing (normal MR brain and EEG in 2009). Nevertheless, as he did have multiple breakthrough seizures in 2008, and as he appears to be tolerating keppra very well, it would be prudent to continue anticonvulsant treatment for the forseeable future.    Last keppra level 1/24/17 was 34.4. No new meds since that visit - no need to recheck level today.    PLAN  1. Continue keppra 1500mg BID  2. Return to clinic in 1 year    25 min was spent on the visit.  Over 50% of the time was spent on education, counseling about optimal seizure control and coordination of care.    Again, thank you for allowing me to participate in the care of your patient.      Sincerely,    Erika Morales MD

## 2020-01-02 NOTE — PROGRESS NOTES
[facility] Epilepsy Care Progress Note      Patient:  Shabnam Maddox  :  1985   Age:  33 year old   Today's Office Visit:  2020    Epilepsy Data:    Patient History  Primary Epileptologist/Provider: Erika Morales M.D.  Epilepsy Syndrome: (Undetermined)  Epilepsy Syndrome Status: (undetermined)  Age of Onset: 15  Other Relevant Dx/ Issues: Mother is legal guardian     Tests/Surgery History  Last EE09  Last MRI: 09    Seizure Record  Current Visit Date: 20  Previous Visit Date: 19  Months since last visit: 12  Seizure Type 1: Unclassified Seizure  Description of Sz Type 1: whole jackie shaking and jerking, guttural sounds and deep breathing  # of Type 1 Seizure since last visit: 0  Freq. Type 1 / Month: 0        Current Outpatient Medications   Medication Sig Dispense Refill     levETIRAcetam (KEPPRA) 500 MG tablet TAKE THREE TABLETS (1500MG) BY MOUTH TWICE A  tablet 1     Multiple Vitamin (DAILY MULTIVITAMIN PO) Take by mouth daily             Interval History  33 years old with history of seizures in . Patient returns for annual follow-up, accompanied by his group home staff, Bouchra, who had worked with the patient for 3 years.    Shabnam has had no seizures since . No hospital admissions in the last year. No falls.    He has obesity, weight today is 276. Goal is <260.    Patient is unable to complete review of systems, due to cognitive problmes.    Past Medical/Surgical History  Past Medical History:   Diagnosis Date     Autism      Developmental delay      Medications  Current AEDs:  Keppra 1500mg BID - no anger/irritability issues  Lorazepam intensol 2mg/ml - hold 1ml under tongue for 2 sz/24h or any sz>3 min. - Patient has never used this.    Patient is given his medications twice daily at his group home.    Prior AEDs:  Tegretol - transitioned off this in  due to inefficacy.    No Known Allergies    Family History  Very little is known about FH. Mother did not  have seizures. Mother does have obesity and diabetes. Other aspects of family hsitory are unknown.    Social History  Patient resides at a group home. He is well behaved and group home staff have no complaints about behavioral outbursts.      OBJECTIVE  Wt 125.3 kg (276 lb 3.2 oz)   BMI 36.92 kg/m      General exam: General Appearance: No acute distress. HEENT: Normocephalic, atraumatic.  Extremities: No edema, no clubbing, no cyanosis.     Neurologic Exam: Alert and oriented x2. Speech fluent, appropriate. Normal attention. Cranial Nerves: Pupils are equal, round, reactive to light and accomodation. Extraocular movement intact. No facial weakness or asymmetry. Hearing normal. Motor Exam: Normal. Coordination:no ataxia.  Gait and Station: normal.      IMPRESSION  # Epilepsy, NOS  Patient with unspecified seizure disorder - no seizures in nearly 10 years, and no witnesses present to report on prior seizure semiology. Prior workup for seizures was unrevealing (normal MR brain and EEG in 2009). Nevertheless, as he did have multiple breakthrough seizures in 2008, and as he appears to be tolerating keppra very well, it would be prudent to continue anticonvulsant treatment for the forseeable future.    Last keppra level 1/24/17 was 34.4. No new meds since that visit - no need to recheck level today.    PLAN  1. Continue keppra 1500mg BID  2. Return to clinic in 1 year      25 min was spent on the visit.  Over 50% of the time was spent on education, counseling about optimal seizure control and coordination of care.

## 2020-02-11 ENCOUNTER — TELEPHONE (OUTPATIENT)
Dept: NEUROLOGY | Facility: CLINIC | Age: 35
End: 2020-02-11

## 2020-02-11 NOTE — TELEPHONE ENCOUNTER
Receive Seizure action plan form via fax on 2/11/2020. Form save to R drive and encounter routed to RN.

## 2020-02-19 ENCOUNTER — TRANSFERRED RECORDS (OUTPATIENT)
Dept: HEALTH INFORMATION MANAGEMENT | Facility: CLINIC | Age: 35
End: 2020-02-19

## 2020-02-19 NOTE — TELEPHONE ENCOUNTER
Completed form fax to [phone]. Copy sent to [facility] and purple folder

## 2021-01-06 DIAGNOSIS — G40.802 OTHER EPILEPSY WITHOUT STATUS EPILEPTICUS, NOT INTRACTABLE (H): ICD-10-CM

## 2021-01-10 RX ORDER — LEVETIRACETAM 500 MG/1
TABLET ORAL
Qty: 186 TABLET | Refills: 0 | Status: SHIPPED | OUTPATIENT
Start: 2021-01-10 | End: 2021-01-25

## 2021-01-10 NOTE — TELEPHONE ENCOUNTER
Last Clinic Visit: 1/2/20, [place] 1/25/21. Is under age 65, creatinine not needed.  Filling per [facility] medication refill protocols - seizure medications.  Not all labs required.

## 2021-01-25 ENCOUNTER — VIRTUAL VISIT (OUTPATIENT)
Dept: NEUROLOGY | Facility: CLINIC | Age: 36
End: 2021-01-25
Payer: MEDICAID

## 2021-01-25 DIAGNOSIS — G40.802 OTHER EPILEPSY WITHOUT STATUS EPILEPTICUS, NOT INTRACTABLE (H): ICD-10-CM

## 2021-01-25 RX ORDER — LEVETIRACETAM 500 MG/1
1500 TABLET ORAL 2 TIMES DAILY
Qty: 180 TABLET | Refills: 11 | Status: SHIPPED | OUTPATIENT
Start: 2021-01-25 | End: 2022-01-31

## 2021-01-25 NOTE — LETTER
2021       RE: Shabnam Maddox  : 1985   MRN: [id number]      Dear Colleague,    Thank you for referring your patient, Shabnam Maddox, to the [facility] EPILEPSY CARE at [facility]. Please see a copy of my visit note below.    Shabnam is a 35 year old who is being evaluated via a billable video visit.      How would you like to obtain your AVS? Mail a copy  If the video visit is dropped, the invitation should be resent by:  [phone]  Will anyone else be joining your video visit? Yes: Jolie. How would they like to receive their invitation? Other e-mail: none      Video Start Time: 11:07 AM  Video-Visit Details    Type of service:  Video Visit    Video End Time:11:20 AM    Originating Location (pt. Location): Home    Distant Location (provider location):  [facility] EPILEPSY CARE     Platform used for Video Visit: Siftit      [facility] Epilepsy Care Progress Note        Patient:  Shabnam Maddox  :  1985   Age:  33 year old   Today's Office Visit:  2020     Epilepsy Data:     Patient History  Primary Epileptologist/Provider: Erika Morales M.D.  Epilepsy Syndrome: (Undetermined)  Epilepsy Syndrome Status: (undetermined)  Age of Onset: 15  Other Relevant Dx/ Issues: Mother is legal guardian      Tests/Surgery History  Last EE09  Last MRI: 09     Seizure Record  Current Visit Date: 21  Previous Visit Date: 20  Months since last visit: 12  Seizure Type 1: Unclassified Seizure  Description of Sz Type 1: whole jackie shaking and jerking, guttural sounds and deep breathing  # of Type 1 Seizure since last visit: 0  Freq. Type 1 / Month: 0     Current Outpatient Medications   Medication Sig Dispense Refill     levETIRAcetam (KEPPRA) 500 MG tablet TAKE THREE TABLETS (1500MG) BY MOUTH TWICE A  tablet 0     Multiple Vitamin (DAILY MULTIVITAMIN PO) Take by mouth daily              Interval History  35 years old with history of  Open Breast Biopsy: What to Expect at Home  Your Recovery  An open breast biopsy is surgery to take a sample of breast tissue. A breast biopsy may be done to check a lump found during a breast exam. Or it may be done to check an area of concern found on a mammogram or ultrasound. The breast tissue will be sent to a lab, where a doctor will look at the tissue under a microscope to check for breast cancer. Your doctor may have some answers right away. But it can take up to 1 to 2 weeks to get the final results. Your doctor will discuss the results with you. For a few days after the surgery, you will probably feel tired and have some pain. The skin around the cut (incision) may feel firm, swollen, and tender. The area may be bruised. Tenderness usually goes away in a few days, and the bruising within 2 weeks. Firmness and swelling may take 3 to 6 months to go away. The stitches in your incision may dissolve on their own, or the doctor may take them out 7 to 10 days after surgery. This care sheet gives you a general idea about how long it will take for you to recover. But each person recovers at a different pace. Follow the steps below to get better as quickly as possible. How can you care for yourself at home? Activity    · Rest when you feel tired. Getting enough sleep will help you recover.     · Try to walk each day. Start by walking a little more than you did the day before. Bit by bit, increase the amount you walk. Walking boosts blood flow and helps prevent pneumonia and constipation.     · For 2 weeks, avoid strenuous activities that put pressure on your chest or that involve vigorous movement of your upper body and arm on the side of the biopsy. Examples of these might include strenuous housework, holding an active child, jogging, or aerobic exercise.     · For 2 weeks, avoid lifting anything that would make you strain.  This may include heavy grocery bags and milk containers, a heavy briefcase or backpack, cat litter or dog food bags, a vacuum , or a child.     · Ask your doctor when you can drive again.     · You will probably need to take 1 or 2 days off from work. This depends on the type of work you do and how you feel. Diet    · You can eat your normal diet. If your stomach is upset, try bland, low-fat foods like plain rice, broiled chicken, toast, and yogurt. Medicines    · Your doctor will tell you if and when you can restart your medicines. He or she will also give you instructions about taking any new medicines.     · If you take blood thinners, such as warfarin (Coumadin), clopidogrel (Plavix), or aspirin, be sure to talk to your doctor. He or she will tell you if and when to start taking those medicines again. Make sure that you understand exactly what your doctor wants you to do.     · Be safe with medicines. Take pain medicines exactly as directed. ? If the doctor gave you a prescription medicine for pain, take it as prescribed. ? If you are not taking a prescription pain medicine, ask your doctor if you can take an over-the-counter medicine.     · If you think your pain medicine is making you sick to your stomach:  ? Take your medicine after meals (unless your doctor has told you not to). ? Ask your doctor for a different pain medicine.     · If your doctor prescribed antibiotics, take them as directed. Do not stop taking them just because you feel better. You need to take the full course of antibiotics. Incision care    · If you have strips of tape on the incision, leave the tape on for a week or until it falls off.     · Wash the area daily with warm, soapy water, and pat it dry. Don't use hydrogen peroxide or alcohol, which can slow healing. You may cover the area with a gauze bandage if it weeps or rubs against clothing. Change the bandage every day.     · Keep the area clean and dry.    Other instructions    · For the first 3 days after surgery, wear a supportive bra all the seizures, onset in 2008. Video franco for follow-up, accompanied by his group home staff, Bouchra, who had worked with the patient for 3 years.     Shabnam has had no seizures since 2009. No hospital admissions in the last year. No falls.     He has obesity, weight at last visit was 276. Today is 245. Goal is <260.     Patient is unable to complete review of systems, due to cognitive problmes.     Past Medical/Surgical History  Past Medical History        Past Medical History:   Diagnosis Date     Autism       Developmental delay           Medications  Current AEDs:  Keppra 1500mg BID - no anger/irritability issues  Lorazepam intensol 2mg/ml - hold 1ml under tongue for 2 sz/24h or any sz>3 min. - Patient has never used this.     Patient is given his medications twice daily at his group home.     Prior AEDs:  Tegretol - transitioned off this in 2008 due to inefficacy.     No Known Allergies     Family History  Very little is known about FH. Mother did not have seizures. Mother does have obesity and diabetes. Other aspects of family hsitory are unknown.     Social History  Patient resides at a group home. He is well behaved and group home staff have no complaints about behavioral outbursts.        OBJECTIVE    Alert and oriented x 2 to name and time. Speech fluent, mild dysarthria.      IMPRESSION  # Epilepsy, NOS  Patient with unspecified seizure disorder - no seizures in nearly 10 years, and no witnesses present to report on prior seizure semiology. Prior workup for seizures was unrevealing (normal MR brain and EEG in 2009). Nevertheless, as he did have multiple breakthrough seizures in 2008, and as he appears to be tolerating keppra very well, it would be prudent to continue anticonvulsant treatment for the forseeable future.     Last keppra level 1/24/17 was 34.4. No new meds since that visit - no need to recheck level today.     PLAN  1. Continue keppra 1500mg BID  2. Return to clinic in 1 year in person.      22 min  time, even at night. Follow-up care is a key part of your treatment and safety. Be sure to make and go to all appointments, and call your doctor if you are having problems. It's also a good idea to know your test results and keep a list of the medicines you take. When should you call for help? Call 911 anytime you think you may need emergency care. For example, call if:    · You passed out (lost consciousness).     · You have chest pain, are short of breath, or cough up blood.    Call your doctor now or seek immediate medical care if:    · You are sick to your stomach or cannot drink fluids.     · You have pain that does not get better after you take pain medicine.     · You cannot pass stools or gas.     · You have signs of a blood clot in your leg (called a deep vein thrombosis), such as:  ? Pain in your calf, back of the knee, thigh, or groin. ? Redness or swelling in your leg.     · You have signs of infection, such as:  ? Increased pain, swelling, warmth, or redness. ? Red streaks leading from the incision. ? Pus draining from the incision. ? A fever.     · You have loose stitches, or your incision comes open.     · Bright red blood has soaked through the bandage over your incision.    Watch closely for changes in your health, and be sure to contact your doctor if:    · You have any problems.     · You have new or worse swelling or pain in your arm. Where can you learn more? Go to http://sam-solis.info/. Enter C405 in the search box to learn more about \"Open Breast Biopsy: What to Expect at Home. \"  Current as of: March 28, 2018  Content Version: 11.8  © 5831-1498 [employer]. Care instructions adapted under license by [employer] (which disclaims liability or warranty for this information).  If you have questions about a medical condition or this instruction, always ask your healthcare professional. Trice Ferrari disclaims any warranty or total was spent on the visit.      13 min was spent on face to face time  4 min was spent on preparation to review charts and labs and ordering medications and tests  4 min was spent on documentation of clinical information      Erika Morales MD       liability for your use of this information. DISCHARGE SUMMARY from Nurse    PATIENT INSTRUCTIONS:    After general anesthesia or intravenous sedation, for 24 hours or while taking prescription Narcotics:  · Limit your activities  · Do not drive and operate hazardous machinery  · Do not make important personal or business decisions  · Do  not drink alcoholic beverages  · If you have not urinated within 8 hours after discharge, please contact your surgeon on call. Report the following to your surgeon:  · Excessive pain, swelling, redness or odor of or around the surgical area  · Temperature over 100.5  · Nausea and vomiting lasting longer than 4 hours or if unable to take medications  · Any signs of decreased circulation or nerve impairment to extremity: change in color, persistent  numbness, tingling, coldness or increase pain  · Any questions    What to do at Home:  Recommended activity: Activity as tolerated and no driving for today, or while taking narcotic pain medication. *  Please give a list of your current medications to your Primary Care Provider. *  Please update this list whenever your medications are discontinued, doses are      changed, or new medications (including over-the-counter products) are added. *  Please carry medication information at all times in case of emergency situations. These are general instructions for a healthy lifestyle:    No smoking/ No tobacco products/ Avoid exposure to second hand smoke  Surgeon General's Warning:  Quitting smoking now greatly reduces serious risk to your health.     Obesity, smoking, and sedentary lifestyle greatly increases your risk for illness    A healthy diet, regular physical exercise & weight monitoring are important for maintaining a healthy lifestyle    You may be retaining fluid if you have a history of heart failure or if you experience any of the following symptoms:  Weight gain of 3 pounds or more overnight or 5 pounds in a week, increased swelling in our hands or feet or shortness of breath while lying flat in bed. Please call your doctor as soon as you notice any of these symptoms; do not wait until your next office visit. Recognize signs and symptoms of STROKE:    F-face looks uneven    A-arms unable to move or move unevenly    S-speech slurred or non-existent    T-time-call 911 as soon as signs and symptoms begin-DO NOT go       Back to bed or wait to see if you get better-TIME IS BRAIN. Warning Signs of HEART ATTACK     Call 911 if you have these symptoms:   Chest discomfort. Most heart attacks involve discomfort in the center of the chest that lasts more than a few minutes, or that goes away and comes back. It can feel like uncomfortable pressure, squeezing, fullness, or pain.  Discomfort in other areas of the upper body. Symptoms can include pain or discomfort in one or both arms, the back, neck, jaw, or stomach.  Shortness of breath with or without chest discomfort.  Other signs may include breaking out in a cold sweat, nausea, or lightheadedness. Don't wait more than five minutes to call 911 - MINUTES MATTER! Fast action can save your life. Calling 911 is almost always the fastest way to get lifesaving treatment. Emergency Medical Services staff can begin treatment when they arrive -- up to an hour sooner than if someone gets to the hospital by car. The discharge information has been reviewed with the patient. The patient verbalized understanding. Discharge medications reviewed with the patient and appropriate educational materials and side effects teaching were provided. Oxycodone/Acetaminophen (By mouth)   Acetaminophen (y-xkpz-g-MIN-oh-fen), Oxycodone Hydrochloride (il-k-AZQ-done terrance-droe-KLOR-daina)  Treats moderate to moderately severe pain. This medicine is a narcotic pain reliever. Brand Name(s): Endocet, Percocet, Primlev, Xartemis XR   There may be other brand names for this medicine.   When This Medicine Should Not Be Used: This medicine is not right for everyone. Do not use it if you had an allergic reaction to acetaminophen or oxycodone, or if you have serious breathing problems or paralytic ileus. How to Use This Medicine:   Capsule, Liquid, Tablet, Long Acting Tablet  · Your doctor will tell you how much medicine to use. Do not use more than directed. · An overdose can be dangerous. Follow directions carefully so you do not get too much medicine at one time. · Oral liquid: Measure the oral liquid medicine with a marked measuring spoon, oral syringe, or medicine cup. · Swallow the extended-release tablet whole. Do not crush, break, or chew it. Do not lick or wet the tablet before placing it in your mouth. Do not give this medicine through a feeding tube. · This medicine should come with a Medication Guide. Ask your pharmacist for a copy if you do not have one. · Missed dose: If you miss a dose of this medicine, skip the missed dose and go back to your regular dosing schedule. Do not double doses. · Store the medicine in a closed container at room temperature, away from heat, moisture, and direct light. Ask your pharmacist about the best way to dispose of medicine you do not use. Drugs and Foods to Avoid:   Ask your doctor or pharmacist before using any other medicine, including over-the-counter medicines, vitamins, and herbal products. · Do not use Xartemis XR if you are using or have used an MAO inhibitor in the past 14 days. · Some medicines can affect how this medicine works.  Tell your doctor if you are using any of the following:   ¨ Carbamazepine, erythromycin, ketoconazole, lamotrigine, mirtazapine, naltrexone, phenytoin, propranolol, rifampin, ritonavir, tramadol, trazodone, or zidovudine  ¨ Birth control pills  ¨ Diuretic (water pill)  ¨ Medicine to treat depression  ¨ Phenothiazine medicine  ¨ Triptan medicine to treat migraine headaches  · Do not drink alcohol while you are using this medicine. Acetaminophen can damage your liver, and alcohol can increase this risk. Do not take acetaminophen without asking your doctor if you have 3 or more drinks of alcohol every day. · Tell your doctor if you use anything else that makes you sleepy. Some examples are allergy medicine, narcotic pain medicine, and alcohol. Tell your doctor if you are using buprenorphine, butorphanol, nalbuphine, pentazocine, a benzodiazepine, or a muscle relaxer. Warnings While Using This Medicine:   · Tell your doctor if you are pregnant or breastfeeding, or if you have kidney disease, liver disease, heart disease, low blood pressure, breathing problems or lung disease (such as asthma, COPD), thyroid problems, [place] disease, pancreas or gallbladder problems, prostate problems, trouble urinating, or a stomach problems, or a history of head injury or brain damage, seizures, or alcohol or drug abuse. Tell your doctor if you are allergic to codeine. · This medicine may cause the following problems:  ¨ High risk of overdose, which can lead to death  ¨ Respiratory depression (serious breathing problem that can be life-threatening)  ¨ Liver problems  ¨ Serious skin reactions  ¨ Serotonin syndrome (when used with certain medicines)  · This medicine may make you dizzy or drowsy. Do not drive or do anything that could be dangerous until you know how this medicine affects you. Sit or lie down if you feel dizzy. Stand up carefully. · This medicine contains acetaminophen. Read the labels of all other medicines you are using to see if they also contain acetaminophen, or ask your doctor or pharmacist. Effie Correa not use more than 4 grams (4,000 milligrams) total of acetaminophen in one day. · This medicine can be habit-forming. Do not use more than your prescribed dose. Call your doctor if you think your medicine is not working. · Do not stop using this medicine suddenly.  Your doctor will need to slowly decrease your dose before you stop it completely. · This medicine could cause infertility. Talk with your doctor before using this medicine if you plan to have children. · This medicine may cause constipation, especially with long-term use. Ask your doctor if you should use a laxative to prevent and treat constipation. · Keep all medicine out of the reach of children. Never share your medicine with anyone. Possible Side Effects While Using This Medicine:   Call your doctor right away if you notice any of these side effects:  · Allergic reaction: Itching or hives, swelling in your face or hands, swelling or tingling in your mouth or throat, chest tightness, trouble breathing  · Anxiety, restlessness, fast heartbeat, fever, muscle spasms, twitching, diarrhea, seeing or hearing things that are not there  · Blistering, peeling, red skin rash  · Blue lips, fingernails, or skin  · Dark urine or pale stools, loss of appetite, stomach pain, yellow skin or eyes  · Extreme weakness, shallow breathing, uneven heartbeat, seizures, sweating, or cold or clammy skin  · Severe confusion, lightheadedness, dizziness, or fainting  · Severe constipation, nausea, or vomiting  · Trouble breathing or slow breathing  If you notice these less serious side effects, talk with your doctor:   · Headache  · Mild constipation, nausea, or vomiting  · Mild sleepiness or drowsiness  If you notice other side effects that you think are caused by this medicine, tell your doctor. Call your doctor for medical advice about side effects. You may report side effects to FDA at 6-742-FDA-9834  © 2017 [facility] Information is for End User's use only and may not be sold, redistributed or otherwise used for commercial purposes. The above information is an  only. It is not intended as medical advice for individual conditions or treatments.  Talk to your doctor, nurse or pharmacist before following any medical regimen to see if it is safe and effective for you.

## 2021-01-25 NOTE — PROGRESS NOTES
[facility] Epilepsy Care Progress Note        Patient:  Shabnam Maddox  :  1985   Age:  33 year old   Today's Office Visit:  2020     Epilepsy Data:     Patient History  Primary Epileptologist/Provider: Erika Morales M.D.  Epilepsy Syndrome: (Undetermined)  Epilepsy Syndrome Status: (undetermined)  Age of Onset: 15  Other Relevant Dx/ Issues: Mother is legal guardian      Tests/Surgery History  Last EE09  Last MRI: 09     Seizure Record  Current Visit Date: 21  Previous Visit Date: 20  Months since last visit: 12  Seizure Type 1: Unclassified Seizure  Description of Sz Type 1: whole jackie shaking and jerking, guttural sounds and deep breathing  # of Type 1 Seizure since last visit: 0  Freq. Type 1 / Month: 0     Current Outpatient Medications   Medication Sig Dispense Refill     levETIRAcetam (KEPPRA) 500 MG tablet TAKE THREE TABLETS (1500MG) BY MOUTH TWICE A  tablet 0     Multiple Vitamin (DAILY MULTIVITAMIN PO) Take by mouth daily              Interval History  35 years old with history of seizures, onset in . Video franco for follow-up, accompanied by his group home staff, Bouchra, who had worked with the patient for 3 years.     Shabnam has had no seizures since . No hospital admissions in the last year. No falls.     He has obesity, weight at last visit was 276. Today is 245. Goal is <260.     Patient is unable to complete review of systems, due to cognitive problmes.     Past Medical/Surgical History  Past Medical History   Past Medical History:   Diagnosis Date     Autism       Developmental delay           Medications  Current AEDs:  Keppra 1500mg BID - no anger/irritability issues  Lorazepam intensol 2mg/ml - hold 1ml under tongue for 2 sz/24h or any sz>3 min. - Patient has never used this.     Patient is given his medications twice daily at his group home.     Prior AEDs:  Tegretol - transitioned off this in  due to inefficacy.     No Known  Allergies     Family History  Very little is known about FH. Mother did not have seizures. Mother does have obesity and diabetes. Other aspects of family hsitory are unknown.     Social History  Patient resides at a group home. He is well behaved and group home staff have no complaints about behavioral outbursts.        OBJECTIVE    Alert and oriented x 2 to name and time. Speech fluent, mild dysarthria.      IMPRESSION  # Epilepsy, NOS  Patient with unspecified seizure disorder - no seizures in nearly 10 years, and no witnesses present to report on prior seizure semiology. Prior workup for seizures was unrevealing (normal MR brain and EEG in 2009). Nevertheless, as he did have multiple breakthrough seizures in 2008, and as he appears to be tolerating keppra very well, it would be prudent to continue anticonvulsant treatment for the forseeable future.     Last keppra level 1/24/17 was 34.4. No new meds since that visit - no need to recheck level today.     PLAN  1. Continue keppra 1500mg BID  2. Return to clinic in 1 year in person.      22 min total was spent on the visit.      13 min was spent on face to face time  4 min was spent on preparation to review charts and labs and ordering medications and tests  4 min was spent on documentation of clinical information

## 2021-01-25 NOTE — PROGRESS NOTES
Shabnam is a 35 year old who is being evaluated via a billable video visit.      How would you like to obtain your AVS? Mail a copy  If the video visit is dropped, the invitation should be resent by:  [phone]  Will anyone else be joining your video visit? Yes: Jolie. How would they like to receive their invitation? Other e-mail: none      Video Start Time: 11:07 AM  Video-Visit Details    Type of service:  Video Visit    Video End Time:11:20 AM    Originating Location (pt. Location): Home    Distant Location (provider location):  [facility] EPILEPSY CARE     Platform used for Video Visit: Libert[employer]

## 2021-04-01 ENCOUNTER — TELEPHONE (OUTPATIENT)
Dept: NEUROLOGY | Facility: CLINIC | Age: 36
End: 2021-04-01

## 2021-04-01 NOTE — LETTER
4/1/2021       RE: Shabnam Maddox  [address] [facility] [id number]        SEIZURE PLAN AND PROTOCOL    Type of Seizure(s):    Full body shaking and jerking  (unclassified seizure)  Controlled for >10 years      Plan of Care:      Follow general seizure care and First Aid guidelines for seizures.    Anticonvulsant medications will be administered as prescribed.    All seizures will be documented on a Seizure Report including:  date, time, length of seizure, specific body movements and behaviors exhibited during the seizure, and post-seizure state.    Antiepileptic blood levels will be ordered by the physician based upon client's condition and medications.    Missed Doses:    IF YOU REALIZE WITHIN 24 HOURS:    ...You MISSED ONE DOSE of your anticonvulsant medication(s), take the missed dose immediately unless it is time for your next scheduled dose. If that is the case, take your next scheduled dose, wait two hours, and then take the missed dose.    ...You MISSED TWO OR MORE DOSES, take one of the missed doses immediately, even if it is time for your next scheduled dose. Then call the [facility] clinic to schedule an appointment.     IF YOU REALIZE NOW YOU MISSED A DOSE MORE THAN 24 HOURS AGO, tate it on your calendar. DO NOT take an extra dose.        Medication Errors:    Your facility nurse should be notified of any medication errors. The nurse should observe the urgency of the error. It is not necessary to notify the MD on call unless the facility nurse or delegate believes it to be life threatening or could possibly result in a serious complication. Routine notices required by regulation should be telephoned to the clinic during office hours.    Extra Dose:    An extra dose of an antiepileptic drug is not serious. Ordinarily, at most, the client may experience increased side effects for several hours. Contact your facility nurse immediately if an extra dose was given.      When to call 911 for  Seizures:    Call 911 if:    The person does not start breathing within 1 minute after the seizure. If this happens call 911 immediately and start CPR.    The person sustains an injury    The person has one seizure right after another without regaining consciousness in between    A convulsive seizure lasts over 5 minutes    The person requests an ambulance    Facility protocol requires activation of emergency medical services      Provider:              Erika Morales M.D.

## 2021-04-01 NOTE — TELEPHONE ENCOUNTER
Patient GH would like an updated seizure protocol and it should be mailed to address on file when done.

## 2021-04-01 NOTE — TELEPHONE ENCOUNTER
Seizure protocol letter compiled, printed, and placed on MD work pile for signature.  To be mailed once signed.

## 2022-01-31 ENCOUNTER — OFFICE VISIT (OUTPATIENT)
Dept: NEUROLOGY | Facility: CLINIC | Age: 37
End: 2022-01-31
Payer: MEDICAID

## 2022-01-31 VITALS
TEMPERATURE: 97.1 F | WEIGHT: 266.2 LBS | HEIGHT: 73 IN | BODY MASS INDEX: 35.28 KG/M2 | DIASTOLIC BLOOD PRESSURE: 116 MMHG | HEART RATE: 101 BPM | SYSTOLIC BLOOD PRESSURE: 163 MMHG

## 2022-01-31 DIAGNOSIS — G40.802 OTHER EPILEPSY WITHOUT STATUS EPILEPTICUS, NOT INTRACTABLE (H): ICD-10-CM

## 2022-01-31 PROCEDURE — 80177 DRUG SCRN QUAN LEVETIRACETAM: CPT | Performed by: PSYCHIATRY & NEUROLOGY

## 2022-01-31 RX ORDER — LEVETIRACETAM 500 MG/1
1500 TABLET ORAL 2 TIMES DAILY
Qty: 180 TABLET | Refills: 11 | Status: SHIPPED | OUTPATIENT
Start: 2022-01-31 | End: 2023-02-20

## 2022-01-31 ASSESSMENT — MIFFLIN-ST. JEOR: SCORE: 2181.36

## 2022-01-31 NOTE — LETTER
2022     RE: Shabnam Maddox  : 1985   MRN: [id number]      Dear Colleague,    Thank you for referring your patient, Shabnam Maddox, to the [facility] EPILEPSY CARE at [facility]. Please see a copy of my visit note below.    [facility] [[email or web address]        Patient:  Shabnam Maddox  :  1985   Age:  33 year old   Today's Office Visit:  2022     Epilepsy Data:     Patient History  Primary Epileptologist/Provider: Erika Morales M.D.  Epilepsy Syndrome: (Undetermined)  Epilepsy Syndrome Status: (undetermined)  Age of Onset: 15  Other Relevant Dx/ Issues: Mother is legal guardian      Tests/Surgery History  Last EE09  Last MRI: 09     Seizure Record  Current Visit Date: 2022  Previous Visit Date: 2021  Months since last visit: 12  Seizure Type 1: Unclassified Seizure  Description of Sz Type 1: whole jackie shaking and jerking, guttural sounds and deep breathing  # of Type 1 Seizure since last visit: 0  Freq. Type 1 / Month: 0     Current Outpatient Medications   Medication Sig Dispense Refill     levETIRAcetam (KEPPRA) 500 MG tablet Take 3 tablets (1,500 mg) by mouth 2 times daily 180 tablet 11     Multiple Vitamin (DAILY MULTIVITAMIN PO) Take by mouth daily              Brief HPI and Interval History  This is a 35 years old with history of seizures, onset in . In-person follow-up, accompanied by his group home staff, Bouchra, who had worked with the patient for 4 years.     He lives in a group home and someone typically is with him 24 hours a day. He had not had any seizures since his last appointment one year ago.     He is taking his medications as prescribed. No reported side effects on the medications. No falls or gait impairment/imbalance. His mood has been well. No anger or irritability.    No hospitalizations or new medications in the past year.    Shabnam has had no seizures since .        Past  "Medical/Surgical History  Past Medical History        Past Medical History:   Diagnosis Date     Autism       Developmental delay           Medications  Current AEDs:  Keppra 1500mg BID - no anger/irritability issues  Lorazepam intensol 2mg/ml - hold 1ml under tongue for 2 sz/24h or any sz>3 min. - Patient has never used this.     Patient is given his medications twice daily at his group home.     Prior AEDs:  Tegretol - transitioned off this in 2008 due to inefficacy.     Allergies  No Known Allergies     Family History  Very little is known about FH. Mother did not have seizures. Mother does have obesity and diabetes. Other aspects of family hsitory are unknown.     Social History  Patient resides at a group home. He is well behaved and group home staff have no complaints about behavioral outbursts.     OBJECTIVE  BP (!) 163/116   Pulse 101   Temp 97.1  F (36.2  C) (Temporal)   Ht 6' 1\" (185.4 cm)   Wt 266 lb 3.2 oz (120.7 kg)   BMI 35.12 kg/m      General: No acute distress.  HEENT: Normocephalic, atraumatic. Sclera anicteric. No nasal drainage or epistaxis.  CV: Extremities appear to be appropriately perfused.  Resp: Breathing comfortably on room air. No accessory muscle use.  Abd: Soft, non-distended.  Ext: No LE edema.  Skin: Warm, dry. No jaundice.    MSE: Alert and conversant. Oriented to month/year - reports first name is \"Andrew\" and unable to identify current location. Follows simple commands. Naming and repetition intact.  CN: Gaze is conjugate, no gaze preference. EOMI, no nystagmus. Pupils are 3-4mm, round, reactive to light. Facial sensation intact to light touch in the V1-V3 regions. Facial movements symmetric. Hearing is intact to conversation. Equal palate elevation, uvula midline. Tongue protrudes without deviation. Speech is soft, no dysarthria.  Motor: Normal bulk and tone. No abnormal movements. Unable to follow formal evaluation, but he is moving all extremities symmetrically and " resisting movements by examiner.  Reflexes: 2+ biceps, brachioradialis, patellar reflexes.  Sensory: Intact to light touch and vibration in the upper and lower extremities.  Coordination: FNF without dysmetria or tremors.   Gait: No ataxia.     IMPRESSION  # Epilepsy, NOS  Patient with unspecified seizure disorder - no seizures in over 10 years. There are no witnesses present to report on prior seizure semiology. Prior workup for seizures was unrevealing (normal MR brain and EEG in 2009). Nevertheless, as he did have multiple breakthrough seizures in 2008, and as he appears to be tolerating Keppra very well, it would be prudent to continue anticonvulsant treatment for the forseeable future.    In the past year, he has not had breakthrough seizures and someone typically gives him his medications. He is not having side effects on Keppra.     Last keppra level 1/24/17 was 34.4 and we will check new levels today.     PLAN  1. Continue Keppra 1500mg BID.   2. Check Keppra levels today.  3. Return to clinic in 1 year in person.    The patient was seen and discussed with the attending neurologist, Dr. Morales.    Merced Chavez MD   Neurology PGY-3    Attestation signed by Erika Morales MD at 2/1/2022  6:30 PM:  Neurology Attending Note:    I have seen and examined the patient with Dr. Chavez.  I have reviewed the labs and imaging results available.  I agree with the assessment and plan.    Erika Morales MD

## 2022-01-31 NOTE — PROGRESS NOTES
[facility]        Patient:  Shabnam Maddox  :  1985   Age:  33 year old   Today's Office Visit:  2022     Epilepsy Data:     Patient History  Primary Epileptologist/Provider: Erika Morales M.D.  Epilepsy Syndrome: (Undetermined)  Epilepsy Syndrome Status: (undetermined)  Age of Onset: 15  Other Relevant Dx/ Issues: Mother is legal guardian      Tests/Surgery History  Last EE09  Last MRI: 09     Seizure Record  Current Visit Date: 2022  Previous Visit Date: 2021  Months since last visit: 12  Seizure Type 1: Unclassified Seizure  Description of Sz Type 1: whole jackie shaking and jerking, guttural sounds and deep breathing  # of Type 1 Seizure since last visit: 0  Freq. Type 1 / Month: 0     Current Outpatient Medications   Medication Sig Dispense Refill     levETIRAcetam (KEPPRA) 500 MG tablet Take 3 tablets (1,500 mg) by mouth 2 times daily 180 tablet 11     Multiple Vitamin (DAILY MULTIVITAMIN PO) Take by mouth daily              Brief HPI and Interval History  This is a 35 years old with history of seizures, onset in . In-person follow-up, accompanied by his group home staff, Bouchra, who had worked with the patient for 4 years.     He lives in a group home and someone typically is with him 24 hours a day. He had not had any seizures since his last appointment one year ago.     He is taking his medications as prescribed. No reported side effects on the medications. No falls or gait impairment/imbalance. His mood has been well. No anger or irritability.    No hospitalizations or new medications in the past year.    Shabnam has had no seizures since .        Past Medical/Surgical History  Past Medical History        Past Medical History:   Diagnosis Date     Autism       Developmental delay           Medications  Current AEDs:  Keppra 1500mg BID - no anger/irritability issues  Lorazepam intensol 2mg/ml - hold 1ml under tongue for 2 sz/24h or any  "sz>3 min. - Patient has never used this.     Patient is given his medications twice daily at his group home.     Prior AEDs:  Tegretol - transitioned off this in 2008 due to inefficacy.     Allergies  No Known Allergies     Family History  Very little is known about FH. Mother did not have seizures. Mother does have obesity and diabetes. Other aspects of family hsitory are unknown.     Social History  Patient resides at a group home. He is well behaved and group home staff have no complaints about behavioral outbursts.     OBJECTIVE  BP (!) 163/116   Pulse 101   Temp 97.1  F (36.2  C) (Temporal)   Ht 6' 1\" (185.4 cm)   Wt 266 lb 3.2 oz (120.7 kg)   BMI 35.12 kg/m      General: No acute distress.  HEENT: Normocephalic, atraumatic. Sclera anicteric. No nasal drainage or epistaxis.  CV: Extremities appear to be appropriately perfused.  Resp: Breathing comfortably on room air. No accessory muscle use.  Abd: Soft, non-distended.  Ext: No LE edema.  Skin: Warm, dry. No jaundice.    MSE: Alert and conversant. Oriented to month/year - reports first name is \"Andrew\" and unable to identify current location. Follows simple commands. Naming and repetition intact.  CN: Gaze is conjugate, no gaze preference. EOMI, no nystagmus. Pupils are 3-4mm, round, reactive to light. Facial sensation intact to light touch in the V1-V3 regions. Facial movements symmetric. Hearing is intact to conversation. Equal palate elevation, uvula midline. Tongue protrudes without deviation. Speech is soft, no dysarthria.  Motor: Normal bulk and tone. No abnormal movements. Unable to follow formal evaluation, but he is moving all extremities symmetrically and resisting movements by examiner.  Reflexes: 2+ biceps, brachioradialis, patellar reflexes.  Sensory: Intact to light touch and vibration in the upper and lower extremities.  Coordination: FNF without dysmetria or tremors.   Gait: No ataxia.     IMPRESSION  # Epilepsy, NOS  Patient with " unspecified seizure disorder - no seizures in over 10 years. There are no witnesses present to report on prior seizure semiology. Prior workup for seizures was unrevealing (normal MR brain and EEG in 2009). Nevertheless, as he did have multiple breakthrough seizures in 2008, and as he appears to be tolerating Keppra very well, it would be prudent to continue anticonvulsant treatment for the forseeable future.    In the past year, he has not had breakthrough seizures and someone typically gives him his medications. He is not having side effects on Keppra.     Last keppra level 1/24/17 was 34.4 and we will check new levels today.     PLAN  1. Continue Keppra 1500mg BID.   2. Check Keppra levels today.  3. Return to clinic in 1 year in person.    The patient was seen and discussed with the attending neurologist, Dr. Morales.    Merced Chavez MD   Neurology PGY-3

## 2022-02-01 LAB — LEVETIRACETAM (KEPPRA): 34.7 UG/ML (ref 6–46)

## 2022-10-03 NOTE — LETTER
1/3/2019       RE: Shabnam Maddox  : 1985   MRN: [id number]      Dear Colleague,    Thank you for referring your patient, Shabnam Maddox, to the [facility] EPILEPSY CARE at [facility]. Please see a copy of my visit note below.    [facility]      Patient:  Shabnam Maddox  :  1985   Age:  33 year old   Today's Office Visit:  1/3/2019    Epilepsy Data:    Patient History  Primary Epileptologist/Provider: Erika Morales M.D.  Epilepsy Syndrome: (Undetermined)  Epilepsy Syndrome Status: (undetermined)  Age of Onset: 15  Other Relevant Dx/ Issues: Mother is legal guardian     Tests/Surgery History  Last EE09  Last MRI: 09    Seizure Record  Current Visit Date: 19  Previous Visit Date: 18  Months since last visit: 11.83  Seizure Type 1: Unclassified Seizure  Description of Sz Type 1: whole jackie shaking and jerking, guttural sounds and deep breathing  # of Type 1 Seizure since last visit: 0  Freq. Type 1 / Month: 0        Current Outpatient Medications   Medication Sig Dispense Refill     levETIRAcetam (KEPPRA) 500 MG tablet Take 3 tabs am and 3 tabs hs 180 tablet 11     Multiple Vitamin (DAILY MULTIVITAMIN PO) Take by mouth daily             Interval History  33 years old with history of seizures in . Patient returns for annual follow-up, accompanied by his group home staff.    Shabnam has had no seizures since . No hospital admissions in the last year. No falls.    He has obesity, and had some weight gain up to 300 pounds earlier this year, but they have gotten his weight back down to around 280 recently. Goal is <260.    Patient is unable to complete a 10-point review of systems, due to cognitive problmes.    Past Medical/Surgical History  Past Medical History:   Diagnosis Date     Autism      Developmental delay      Medications  Current AEDs:  Keppra 1500mg BID - no anger/irritability  issues  Lorazepam intensol 2mg/ml - hold 1ml under tongue for 2 sz/24h or any sz>3 min. - Patient has never used this.    Patient is given his medications twice daily at his group home.    Prior AEDs:  Tegretol - transitioned off this in 2008 due to inefficacy.    No Known Allergies    Family History  Very little is known about FH. Mother did not have seizures. Mother does have obesity and diabetes. Other aspects of family hsitory are unknown.    Social History  Patient resides at a group home. He is well behaved and group home staff have no complaints about behavioral outbursts.      OBJECTIVE  /86 (BP Location: Left arm, Patient Position: Chair, Cuff Size: Adult Large)   Pulse 81   Temp 97.2  F (36.2  C)   Wt 268 lb (121.6 kg)   BMI 35.83 kg/m       General exam: General Appearance: No acute distress. HEENT: Normocephalic, atraumatic.  Extremities: No edema, no clubbing, no cyanosis.     Neurologic Exam: Alert and oriented x1. Speech fluent, appropriate. Normal attention. Cranial Nerves: Pupils are equal, round, reactive to light and accomodation. Extraocular movement intact. No facial weakness or asymmetry. Hearing normal. Motor Exam: Normal. Coordination:no ataxia.  Gait and Station: normal.      IMPRESSION  # Epilepsy, NOS  Patient with unspecified seizure disorder - no seizures in nearly 10 years, and no witnesses present to report on prior seizure semiology. Prior workup for seizures was unrevealing (normal MR brain and EEG in 2009). Nevertheless, as he did have multiple breakthrough seizures in 2008, and as he appears to be tolerating keppra very well, it would be prudent to continue anticonvulsant treatment for the forseeable future.    Last keppra level 1/24/17 was 34.4. No new meds since that visit - no need to recheck level today.    PLAN  1. Continue keppra 1500mg BID  2. Return to clinic in 1 year      15 min was spent on the visit.  Over 50% of the time was spent on education, counseling  about optimal seizure control and coordination of care.      Again, thank you for allowing me to participate in the care of your patient.      Sincerely,    Erika Morales MD       Opioid Pregnancy And Lactation Text: These medications can lead to premature delivery and should be avoided during pregnancy. These medications are also present in breast milk in small amounts.

## 2022-10-25 ENCOUNTER — TELEPHONE (OUTPATIENT)
Dept: NEUROLOGY | Facility: CLINIC | Age: 37
End: 2022-10-25

## 2022-10-25 NOTE — TELEPHONE ENCOUNTER
[place], with [facility] is requesting an updated seizure protocol be faxed to [phone]. Orlando can be reached at [phone] with any further questions.

## 2022-10-25 NOTE — LETTER
11/9/2022       RE: Shabnam Maddox  [address] [facility] [address]      SEIZURE PLAN AND PROTOCOL     Type of Seizure(s):     Full body shaking and jerking  (unclassified seizure)  Controlled for >10 years        Plan of Care:       Follow general seizure care and First Aid guidelines for seizures.    Anticonvulsant medications will be administered as prescribed.    All seizures will be documented on a Seizure Report including:  date, time, length of seizure, specific body movements and behaviors exhibited during the seizure, and post-seizure state.    Antiepileptic blood levels will be ordered by the physician based upon client's condition and medications.     Missed Doses:     IF YOU REALIZE WITHIN 24 HOURS:     ...You MISSED ONE DOSE of your anticonvulsant medication(s), take the missed dose immediately unless it is time for your next scheduled dose. If that is the case, take your next scheduled dose, wait two hours, and then take the missed dose.     ...You MISSED TWO OR MORE DOSES, take one of the missed doses immediately, even if it is time for your next scheduled dose. Then call the [facility] clinic to schedule an appointment.      IF YOU REALIZE NOW YOU MISSED A DOSE MORE THAN 24 HOURS AGO, tate it on your calendar. DO NOT take an extra dose.        Medication Errors:     Your facility nurse should be notified of any medication errors. The nurse should observe the urgency of the error. It is not necessary to notify the MD on call unless the facility nurse or delegate believes it to be life threatening or could possibly result in a serious complication. Routine notices required by regulation should be telephoned to the clinic during office hours.     Extra Dose:     An extra dose of an antiepileptic drug is not serious. Ordinarily, at most, the client may experience increased side effects for several hours. Contact your facility nurse immediately if an extra dose was given.        When to  call 911 for Seizures:     Call 911 if:    The person does not start breathing within 1 minute after the seizure. If this happens call 911 immediately and start CPR.    The person sustains an injury    The person has one seizure right after another without regaining consciousness in between    A convulsive seizure lasts over 5 minutes    The person requests an ambulance    Facility protocol requires activation of emergency medical services          Provider:                   Erika Morales M.D.

## 2023-01-05 ENCOUNTER — VIRTUAL VISIT (OUTPATIENT)
Dept: NEUROLOGY | Facility: CLINIC | Age: 38
End: 2023-01-05
Payer: MEDICAID

## 2023-01-05 DIAGNOSIS — G40.909 SEIZURE DISORDER (H): Primary | ICD-10-CM

## 2023-01-05 NOTE — PROGRESS NOTES
VF call the clinic to let us know that she tried calling to room patient but the number is the [facility] where patient live before and they do not have any other contact for patient.    I call the number and was  told the same thing , I also tried the emergency contact twice and no answer.      Jonny Gill, [facility]

## 2023-01-05 NOTE — LETTER
2023       RE: Shabnam Maddox  : 1985   MRN: [id number]      Dear Colleague,    Thank you for referring your patient, Shabnam Maddox, to the [facility] at [facility]. Please see a copy of my visit note below.    VF call the clinic to let us know that she tried calling to room patient but the number is the  number where patient live before and they do not have any other contact for patient.    I call the number and was  told the same thing , I also tried the emergency contact twice and no answer.      Jonny Gill CMA      Again, thank you for allowing me to participate in the care of your patient.      Sincerely,    Erika Morales MD

## 2023-01-06 ENCOUNTER — DOCUMENTATION ONLY (OUTPATIENT)
Dept: OTHER | Facility: CLINIC | Age: 38
End: 2023-01-06

## 2023-01-31 ENCOUNTER — DOCUMENTATION ONLY (OUTPATIENT)
Dept: OTHER | Facility: CLINIC | Age: 38
End: 2023-01-31
Payer: MEDICAID

## 2023-02-15 DIAGNOSIS — G40.802 OTHER EPILEPSY WITHOUT STATUS EPILEPTICUS, NOT INTRACTABLE (H): ICD-10-CM

## 2023-02-20 RX ORDER — LEVETIRACETAM 500 MG/1
1500 TABLET ORAL 2 TIMES DAILY
Qty: 186 TABLET | Refills: 11 | Status: SHIPPED | OUTPATIENT
Start: 2023-02-20 | End: 2024-02-20

## 2023-02-20 NOTE — TELEPHONE ENCOUNTER
levETIRAcetam (KEPPRA) 500 MG tablet   Last Written Prescription Date:  1/31/22  Last Fill Quantity: 180,   # refills: 11  Last Office Visit : 1/5/23  Future Office visit: none

## 2024-02-12 DIAGNOSIS — G40.802 OTHER EPILEPSY WITHOUT STATUS EPILEPTICUS, NOT INTRACTABLE (H): ICD-10-CM

## 2024-02-19 NOTE — TELEPHONE ENCOUNTER
levETIRAcetam (KEPPRA) 500 MG     Last Written Prescription Date:  2/20/23  Last Fill Quantity: 186,   # refills: 11  Last Office Visit : 1/31/22  Future Office visit:  NONE     1/5/23 NO SHOW   RTC   1 YEAR   Routing refill request to provider for review/approval because:  30 DAY REFILL PENDING  Pt outside of RTC timeframe  JAN 2023   Scheduling has been notified to contact the pt for appointment.

## 2024-02-20 RX ORDER — LEVETIRACETAM 500 MG/1
TABLET ORAL
Qty: 180 TABLET | Refills: 1 | Status: SHIPPED | OUTPATIENT
Start: 2024-02-20 | End: 2024-05-06

## 2024-04-26 DIAGNOSIS — G40.802 OTHER EPILEPSY WITHOUT STATUS EPILEPTICUS, NOT INTRACTABLE (H): ICD-10-CM

## 2024-05-03 NOTE — TELEPHONE ENCOUNTER
levETIRAcetam (KEPPRA) 500 MG tablet 180 tablet 1 2/20/2024      TAKE 3 TABLETS (1500MG) BY MOUTH TWICE DAILY      Last Office Visit : 1/5/23  Future Office visit:  0    Routing refill request to provider for review/approval because:  Overdue for follow-up   >14 months since been seen  Given 2 nurys refills   Scheduling has been notified to contact the pt for appointment.

## 2024-05-06 ENCOUNTER — TELEPHONE (OUTPATIENT)
Dept: NEUROLOGY | Facility: CLINIC | Age: 39
End: 2024-05-06

## 2024-05-06 RX ORDER — LEVETIRACETAM 500 MG/1
TABLET ORAL
Qty: 186 TABLET | Refills: 2 | Status: SHIPPED | OUTPATIENT
Start: 2024-05-06 | End: 2024-07-19

## 2024-05-06 NOTE — TELEPHONE ENCOUNTER
Reached out to group home and guardian to schedule follow up visit w/ Dr. Morales. Group Home states patient no longer resides at this address.     No answer on guardian's line. Unable to leave  as voicemail box is full.

## 2024-06-10 ENCOUNTER — MEDICAL CORRESPONDENCE (OUTPATIENT)
Dept: HEALTH INFORMATION MANAGEMENT | Facility: CLINIC | Age: 39
End: 2024-06-10
Payer: MEDICAID

## 2024-06-11 ENCOUNTER — TELEPHONE (OUTPATIENT)
Dept: NEUROLOGY | Facility: CLINIC | Age: 39
End: 2024-06-11

## 2024-06-11 NOTE — TELEPHONE ENCOUNTER
Received seizure protocol to be signed. Saved to [employer], encounter routed.    To be faxed back to [phone]

## 2024-07-16 DIAGNOSIS — G40.802 OTHER EPILEPSY WITHOUT STATUS EPILEPTICUS, NOT INTRACTABLE (H): ICD-10-CM

## 2024-07-18 NOTE — TELEPHONE ENCOUNTER
levETIRAcetam (KEPPRA) 500 MG tablet 186 tablet 2 5/6/2024     Last Office Visit: 1/5/23  Future Office visit:   none    Routing refill request to provider for review/approval because:  Overdue for appt > 14 months  Jewell refill already sent    Rachael Hargrove RN  [facility] Red Flag Triage/MRT      Fair

## 2024-07-19 RX ORDER — LEVETIRACETAM 500 MG/1
TABLET ORAL
Qty: 192 TABLET | Refills: 2 | Status: SHIPPED | OUTPATIENT
Start: 2024-07-19

## 2024-10-09 DIAGNOSIS — G40.802 OTHER EPILEPSY WITHOUT STATUS EPILEPTICUS, NOT INTRACTABLE (H): ICD-10-CM

## 2024-10-15 NOTE — TELEPHONE ENCOUNTER
levETIRAcetam (KEPPRA) 500 MG tablet 192 tablet 2 7/19/2024           Last Office Visit : 1/5/23  Future Office visit:  0    Routing refill request to provider for review/approval because:  Overdue for follow-up   Jewell refill already given

## 2024-10-23 RX ORDER — LEVETIRACETAM 500 MG/1
TABLET ORAL
Qty: 186 TABLET | Refills: 11 | Status: SHIPPED | OUTPATIENT
Start: 2024-10-23